# Patient Record
Sex: MALE | Race: WHITE | URBAN - METROPOLITAN AREA
[De-identification: names, ages, dates, MRNs, and addresses within clinical notes are randomized per-mention and may not be internally consistent; named-entity substitution may affect disease eponyms.]

---

## 2019-09-26 DIAGNOSIS — M25.571 BILATERAL ANKLE PAIN, UNSPECIFIED CHRONICITY: Primary | ICD-10-CM

## 2019-09-26 DIAGNOSIS — M25.572 BILATERAL ANKLE PAIN, UNSPECIFIED CHRONICITY: Primary | ICD-10-CM

## 2019-09-27 ENCOUNTER — OFFICE VISIT (OUTPATIENT)
Dept: ORTHOPEDIC SURGERY | Age: 59
End: 2019-09-27
Payer: COMMERCIAL

## 2019-09-27 VITALS — BODY MASS INDEX: 26.48 KG/M2 | HEIGHT: 70 IN | WEIGHT: 185 LBS

## 2019-09-27 DIAGNOSIS — M76.62 ACHILLES TENDONITIS, BILATERAL: Primary | ICD-10-CM

## 2019-09-27 DIAGNOSIS — M76.61 ACHILLES TENDONITIS, BILATERAL: Primary | ICD-10-CM

## 2019-09-27 PROCEDURE — 99203 OFFICE O/P NEW LOW 30 MIN: CPT | Performed by: FAMILY MEDICINE

## 2019-09-27 RX ORDER — FLUTICASONE PROPIONATE 50 MCG
1 SPRAY, SUSPENSION (ML) NASAL
COMMUNITY

## 2019-09-27 RX ORDER — CETIRIZINE HYDROCHLORIDE 10 MG/1
10 TABLET ORAL
COMMUNITY

## 2019-09-27 RX ORDER — ROSUVASTATIN CALCIUM 10 MG/1
TABLET, COATED ORAL
COMMUNITY
Start: 2019-09-02

## 2019-09-27 RX ORDER — METAXALONE 800 MG/1
TABLET ORAL
Refills: 1 | COMMUNITY
Start: 2019-08-12

## 2021-12-02 ENCOUNTER — OFFICE VISIT (OUTPATIENT)
Dept: PRIMARY CARE CLINIC | Age: 61
End: 2021-12-02
Payer: COMMERCIAL

## 2021-12-02 VITALS
OXYGEN SATURATION: 96 % | DIASTOLIC BLOOD PRESSURE: 81 MMHG | HEIGHT: 70 IN | HEART RATE: 66 BPM | SYSTOLIC BLOOD PRESSURE: 123 MMHG | WEIGHT: 177 LBS | BODY MASS INDEX: 25.34 KG/M2

## 2021-12-02 DIAGNOSIS — F41.9 ANXIETY: ICD-10-CM

## 2021-12-02 DIAGNOSIS — N50.3 EPIDIDYMAL CYST: ICD-10-CM

## 2021-12-02 DIAGNOSIS — E78.00 HYPERCHOLESTEREMIA: Primary | ICD-10-CM

## 2021-12-02 PROCEDURE — 99203 OFFICE O/P NEW LOW 30 MIN: CPT | Performed by: INTERNAL MEDICINE

## 2021-12-02 RX ORDER — ROSUVASTATIN CALCIUM 20 MG/1
TABLET, COATED ORAL
COMMUNITY
End: 2022-04-12 | Stop reason: SDUPTHER

## 2021-12-02 RX ORDER — CETIRIZINE HYDROCHLORIDE 10 MG/1
CAPSULE, LIQUID FILLED ORAL
COMMUNITY

## 2021-12-02 RX ORDER — BUSPIRONE HYDROCHLORIDE 5 MG/1
TABLET ORAL
COMMUNITY
Start: 2021-10-11

## 2021-12-02 RX ORDER — CLONAZEPAM 0.12 MG/1
0.12 TABLET, ORALLY DISINTEGRATING ORAL
Qty: 40 TABLET | Refills: 0 | Status: SHIPPED | OUTPATIENT
Start: 2021-12-02 | End: 2021-12-14 | Stop reason: ALTCHOICE

## 2021-12-02 RX ORDER — FLUTICASONE PROPIONATE 50 MCG
2 SPRAY, SUSPENSION (ML) NASAL DAILY
COMMUNITY

## 2021-12-02 RX ORDER — CLONAZEPAM 0.5 MG/1
TABLET ORAL
COMMUNITY
Start: 2021-11-08 | End: 2021-12-02 | Stop reason: ALTCHOICE

## 2021-12-02 NOTE — PROGRESS NOTES
Identified pt with two pt identifiers(name and ). Reviewed record in preparation for visit and have obtained necessary documentation. Chief Complaint   Patient presents with    New Patient    Establish Care        Vitals:    21 1527   BP: 123/81   Pulse: 66   SpO2: 96%   Weight: 177 lb (80.3 kg)   Height: 5' 10\" (1.778 m)   PainSc:   0 - No pain       Health Maintenance Due   Topic    Hepatitis C Screening     Lipid Screen     DTaP/Tdap/Td series (1 - Tdap)    Colorectal Cancer Screening Combo     Shingrix Vaccine Age 50> (1 of 2)       Coordination of Care Questionnaire:  :   1) Have you been to an emergency room, urgent care, or hospitalized since your last visit? If yes, where when, and reason for visit? N/A       2. Have seen or consulted any other health care provider since your last visit?    If yes, where when, and reason for visit?  n/a

## 2021-12-02 NOTE — PROGRESS NOTES
Leona Arreola is a 57 y.o.  male and presents with     Chief Complaint   Patient presents with    New Patient    Establish Care     Pt is here to establish care. Pt used to live in [place] and moved to [facility] and lived there for 12 years before returning to [place]  Pt used to see Psych in [facility]. Pt used to see PCP in [facility],  [address] takes klonopin and buspar  For anxiety and depression  Pt had episode of epidymidis in the past and was treated with abx. Pt had vasectomy in the past.  Pt had colonscopy in the past and it was normal  PSA - was normal.        Past Medical History:   Diagnosis Date    Depression      History reviewed. No pertinent surgical history. Current Outpatient Medications   Medication Sig    busPIRone (BUSPAR) 5 mg tablet TAKE 1/2 TABLET BY MOUTH TWICE DAILY    rosuvastatin (Crestor) 20 mg tablet Take  by mouth.  fluticasone propionate (Flonase Allergy Relief) 50 mcg/actuation nasal spray 2 Sprays by Both Nostrils route daily.  Cetirizine (ZyrTEC) 10 mg cap Take  by mouth.  clonazePAM (KlonoPIN) 0.125 mg disintegrating tablet Take 1 Tablet by mouth two (2) times daily as needed for Anxiety. Max Daily Amount: 0.25 mg. No current facility-administered medications for this visit. Health Maintenance   Topic Date Due    Hepatitis C Screening  Never done    Lipid Screen  Never done    DTaP/Tdap/Td series (1 - Tdap) Never done    Colorectal Cancer Screening Combo  Never done    Shingrix Vaccine Age 50> (1 of 2) Never done    Flu Vaccine  Completed    COVID-19 Vaccine  Completed    Pneumococcal 0-64 years  Aged [employer] History   Administered Date(s) Administered    COVID-19, PFIZER, MRNA, LNP-S, PF, 30MCG/0.3ML DOSE 03/11/2021, 04/04/2021, 10/12/2021    Influenza Vaccine 11/01/2021     No LMP for male patient. Allergies and Intolerances: Allergies   Allergen Reactions    Grass Pollen Sneezing    Mold Sneezing       Family History:   History reviewed.  No pertinent family history. Social History:   He  reports that he has never smoked. He has never used smokeless tobacco.  He  reports current alcohol use. Review of Systems:   General: negative for - chills, fatigue, fever, weight change  Psych: negative for - anxiety, depression, irritability or mood swings  ENT: negative for - headaches, hearing change, nasal congestion, oral lesions, sneezing or sore throat  Heme/ Lymph: negative for - bleeding problems, bruising, pallor or swollen lymph nodes  Endo: negative for - hot flashes, polydipsia/polyuria or temperature intolerance  Resp: negative for - cough, shortness of breath or wheezing  CV: negative for - chest pain, edema or palpitations  GI: negative for - abdominal pain, change in bowel habits, constipation, diarrhea or nausea/vomiting  : negative for - dysuria, hematuria, incontinence, pelvic pain or vulvar/vaginal symptoms  MSK: negative for - joint pain, joint swelling or muscle pain  Neuro: negative for - confusion, headaches, seizures or weakness  Derm: negative for - dry skin, hair changes, rash or skin lesion changes          Physical:   Vitals:   Vitals:    12/02/21 1527   BP: 123/81   Pulse: 66   SpO2: 96%   Weight: 177 lb (80.3 kg)   Height: 5' 10\" (1.778 m)           Exam:   HEENT- atraumatic,normocephalic, awake, oriented, well nourished  Neck - supple,no enlarged lymph nodes, no JVD, no thyromegaly  Chest- CTA, no rhonchi, no crackles  Heart- rrr, no murmurs / gallop/rub  Abdomen- soft,BS+,NT, no hepatosplenomegaly  Ext - no c/c/edema   Neuro- no focal deficits. Power 5/5 all extremities  Skin - warm,dry, no obvious rashes.           Review of Data:   LABS:   No results found for: WBC, HGB, HCT, PLT, HGBEXT, HCTEXT, PLTEXT, HGBEXT, HCTEXT, PLTEXT  No results found for: NA, K, CL, CO2, GLU, BUN, CREA  No results found for: CHOL, CHOLX, CHLST, CHOLV, HDL, HDLP, LDL, LDLC, DLDLP, TGLX, TRIGL, TRIGP  No components found for: GPT        Impression / Plan:        ICD-10-CM ICD-9-CM    1. Hypercholesteremia  E78.00 272.0    2. Anxiety  F41.9 300.00 clonazePAM (KlonoPIN) 0.125 mg disintegrating tablet   3. Epididymal cyst  N50.3 608.89        Explained to patient risk benefits of the medications. Advised patient to stop meds if having any side effects. Pt verbalized understanding of the instructions. I have discussed the diagnosis with the patient and the intended plan as seen in the above orders. The patient has received an after-visit summary and questions were answered concerning future plans. I have discussed medication side effects and warnings with the patient as well. I have reviewed the plan of care with the patient, accepted their input and they are in agreement with the treatment goals. Reviewed plan of care. Patient has provided input and agrees with goals. Follow-up and Dispositions    · Return in about 4 months (around 4/2/2022).          Harjeet Young MD

## 2021-12-14 DIAGNOSIS — F41.9 ANXIETY: Primary | ICD-10-CM

## 2021-12-14 RX ORDER — CLONAZEPAM 0.5 MG/1
TABLET ORAL
Qty: 30 TABLET | Refills: 0 | Status: SHIPPED | OUTPATIENT
Start: 2021-12-14

## 2022-04-12 ENCOUNTER — OFFICE VISIT (OUTPATIENT)
Dept: PRIMARY CARE CLINIC | Age: 62
End: 2022-04-12
Payer: COMMERCIAL

## 2022-04-12 VITALS
TEMPERATURE: 97.8 F | RESPIRATION RATE: 18 BRPM | HEIGHT: 70 IN | SYSTOLIC BLOOD PRESSURE: 120 MMHG | HEART RATE: 69 BPM | OXYGEN SATURATION: 97 % | WEIGHT: 181 LBS | DIASTOLIC BLOOD PRESSURE: 72 MMHG | BODY MASS INDEX: 25.91 KG/M2

## 2022-04-12 DIAGNOSIS — Z12.5 PROSTATE CANCER SCREENING: ICD-10-CM

## 2022-04-12 DIAGNOSIS — E78.00 HYPERCHOLESTEREMIA: Primary | ICD-10-CM

## 2022-04-12 DIAGNOSIS — F41.9 ANXIETY: ICD-10-CM

## 2022-04-12 PROCEDURE — 99213 OFFICE O/P EST LOW 20 MIN: CPT | Performed by: INTERNAL MEDICINE

## 2022-04-12 RX ORDER — ROSUVASTATIN CALCIUM 20 MG/1
20 TABLET, COATED ORAL
Qty: 90 TABLET | Refills: 1 | Status: SHIPPED | COMMUNITY
Start: 2022-04-12 | End: 2022-04-12 | Stop reason: SDUPTHER

## 2022-04-12 RX ORDER — ROSUVASTATIN CALCIUM 20 MG/1
20 TABLET, COATED ORAL
Qty: 90 TABLET | Refills: 3 | Status: SHIPPED | OUTPATIENT
Start: 2022-04-12

## 2022-04-12 NOTE — PROGRESS NOTES
Chief Complaint   Patient presents with    Follow-up     4 month follow up         Visit Vitals  /72 (BP 1 Location: Right upper arm, BP Patient Position: Sitting)   Pulse 69   Temp 97.8 °F (36.6 °C) (Temporal)   Resp 18   Ht 5' 10\" (1.778 m)   Wt 181 lb (82.1 kg)   SpO2 97%   BMI 25.97 kg/m²        1. Have you been to the ER, urgent care clinic since your last visit? Hospitalized since your last visit? No    2. Have you seen or consulted any other health care providers outside of the [address] [place] [address] since your last visit? Include any pap smears or colon screening.  No

## 2022-04-12 NOTE — PROGRESS NOTES
Ronald Montero is a 57 y.o.  male and presents with     Chief Complaint   Patient presents with    Follow-up     4 month follow up      Pt is  Here for follow up. He had labs done at his job , had wellness exam and labs done were normal    Pt has nocturia times once or two. Pt already had colnoscopy 3 years back and it was normal  Pt feels fine  NO constipation , rectal bleeding or dark stools  No abdominal pain nausea vomiting, no stools or rectal bleeding      Past Medical History:   Diagnosis Date    Depression      No past surgical history on file. Current Outpatient Medications   Medication Sig    rosuvastatin (Crestor) 20 mg tablet Take 1 Tablet by mouth nightly.  clonazePAM (KlonoPIN) 0.5 mg tablet Half tab orally  has prn    busPIRone (BUSPAR) 5 mg tablet TAKE 1/2 TABLET BY MOUTH TWICE DAILY    fluticasone propionate (Flonase Allergy Relief) 50 mcg/actuation nasal spray 2 Sprays by Both Nostrils route daily.  Cetirizine (ZyrTEC) 10 mg cap Take  by mouth. No current facility-administered medications for this visit. Health Maintenance   Topic Date Due    Lipid Screen  Never done    DTaP/Tdap/Td series (1 - Tdap) Never done    Shingrix Vaccine Age 50> (1 of 2) Never done    Depression Screen  12/02/2022    Colorectal Cancer Screening Combo  05/09/2029    Flu Vaccine  Completed    COVID-19 Vaccine  Completed    Pneumococcal 0-64 years  Aged Out    Hepatitis C Screening  Discontinued     Immunization History   Administered Date(s) Administered    COVID-19, Pfizer Purple top, DILUTE for use, 12+ yrs, 30mcg/0.3mL dose 03/11/2021, 04/04/2021, 10/12/2021, 04/05/2022    Influenza Vaccine 11/01/2021     No LMP for male patient. Allergies and Intolerances: Allergies   Allergen Reactions    Grass Pollen Sneezing    Mold Sneezing       Family History:   No family history on file. Social History:   He  reports that he has never smoked.  He has never used smokeless tobacco.  He reports current alcohol use. Review of Systems:   General: negative for - chills, fatigue, fever, weight change  Psych: negative for - anxiety, depression, irritability or mood swings  ENT: negative for - headaches, hearing change, nasal congestion, oral lesions, sneezing or sore throat  Heme/ Lymph: negative for - bleeding problems, bruising, pallor or swollen lymph nodes  Endo: negative for - hot flashes, polydipsia/polyuria or temperature intolerance  Resp: negative for - cough, shortness of breath or wheezing  CV: negative for - chest pain, edema or palpitations  GI: negative for - abdominal pain, change in bowel habits, constipation, diarrhea or nausea/vomiting  : negative for - dysuria, hematuria, incontinence, pelvic pain or vulvar/vaginal symptoms  MSK: negative for - joint pain, joint swelling or muscle pain  Neuro: negative for - confusion, headaches, seizures or weakness  Derm: negative for - dry skin, hair changes, rash or skin lesion changes          Physical:   Vitals:   Vitals:    04/12/22 1534   BP: 120/72   Pulse: 69   Resp: 18   Temp: 97.8 °F (36.6 °C)   TempSrc: Temporal   SpO2: 97%   Weight: 181 lb (82.1 kg)   Height: 5' 10\" (1.778 m)           Exam:   HEENT- atraumatic,normocephalic, awake, oriented, well nourished  Neck - supple,no enlarged lymph nodes, no JVD, no thyromegaly  Chest- CTA, no rhonchi, no crackles  Heart- rrr, no murmurs / gallop/rub  Abdomen- soft,BS+,NT, no hepatosplenomegaly, mild suprapubic dullness  Ext - no c/c/edema   Neuro- no focal deficits. Power 5/5 all extremities  Skin - warm,dry, no obvious rashes.           Review of Data:   LABS:   No results found for: WBC, HGB, HCT, PLT, HGBEXT, HCTEXT, PLTEXT, HGBEXT, HCTEXT, PLTEXT  No results found for: NA, K, CL, CO2, GLU, BUN, CREA  No results found for: CHOL, CHOLX, CHLST, CHOLV, HDL, HDLP, LDL, LDLC, DLDLP, TGLX, TRIGL, TRIGP  No components found for: GPT        Impression / Plan:        ICD-10-CM ICD-9-CM 1. Hypercholesteremia  E78.00 272.0 CBC WITH AUTOMATED DIFF      LIPID PANEL      METABOLIC PANEL, COMPREHENSIVE      rosuvastatin (Crestor) 20 mg tablet      DISCONTINUED: rosuvastatin (Crestor) 20 mg tablet   2. Anxiety  F41.9 300.00    3. Prostate cancer screening  Z12.5 V76.44 PSA, DIAGNOSTIC (PROSTATE SPECIFIC AG)     Mild suprapubic dullness -could be related to slightly enlarged prostate. If PSA is normal and patient continues to have nocturia-may need to see urologist for possible BPH. Patient is seeing a mental health provider was prescribing him Klonopin and BuSpar. Explained to patient risk benefits of the medications. Advised patient to stop meds if having any side effects. Pt verbalized understanding of the instructions. I have discussed the diagnosis with the patient and the intended plan as seen in the above orders. The patient has received an after-visit summary and questions were answered concerning future plans. I have discussed medication side effects and warnings with the patient as well. I have reviewed the plan of care with the patient, accepted their input and they are in agreement with the treatment goals. Reviewed plan of care. Patient has provided input and agrees with goals.         Jacqueline Alcantara MD

## 2022-06-09 DIAGNOSIS — R97.20 ELEVATED PSA: Primary | ICD-10-CM

## 2023-02-21 ENCOUNTER — OFFICE VISIT (OUTPATIENT)
Dept: PRIMARY CARE CLINIC | Age: 63
End: 2023-02-21
Payer: COMMERCIAL

## 2023-02-21 VITALS
OXYGEN SATURATION: 96 % | SYSTOLIC BLOOD PRESSURE: 133 MMHG | DIASTOLIC BLOOD PRESSURE: 80 MMHG | BODY MASS INDEX: 26.63 KG/M2 | TEMPERATURE: 97.7 F | WEIGHT: 186 LBS | HEART RATE: 72 BPM | HEIGHT: 70 IN | RESPIRATION RATE: 18 BRPM

## 2023-02-21 DIAGNOSIS — Z12.5 PROSTATE CANCER SCREENING: Primary | ICD-10-CM

## 2023-02-21 DIAGNOSIS — M79.10 MUSCLE PAIN: ICD-10-CM

## 2023-02-21 DIAGNOSIS — L72.9 SCROTAL CYST: ICD-10-CM

## 2023-02-21 DIAGNOSIS — M54.50 ACUTE LEFT-SIDED LOW BACK PAIN WITHOUT SCIATICA: ICD-10-CM

## 2023-02-21 DIAGNOSIS — R09.81 NASAL CONGESTION: ICD-10-CM

## 2023-02-21 LAB
BILIRUB UR QL STRIP: NEGATIVE
GLUCOSE UR-MCNC: NEGATIVE MG/DL
KETONES P FAST UR STRIP-MCNC: NEGATIVE MG/DL
PH UR STRIP: 6.5 [PH] (ref 4.6–8)
PROT UR QL STRIP: NEGATIVE
QUICKVUE INFLUENZA TEST: NEGATIVE
SP GR UR STRIP: 1.03 (ref 1–1.03)
UA UROBILINOGEN AMB POC: NORMAL (ref 0.2–1)
URINALYSIS CLARITY POC: CLEAR
URINALYSIS COLOR POC: YELLOW
URINE BLOOD POC: NEGATIVE
URINE LEUKOCYTES POC: NEGATIVE
URINE NITRITES POC: NEGATIVE
VALID INTERNAL CONTROL?: YES

## 2023-02-21 PROCEDURE — 87804 INFLUENZA ASSAY W/OPTIC: CPT | Performed by: INTERNAL MEDICINE

## 2023-02-21 PROCEDURE — 81003 URINALYSIS AUTO W/O SCOPE: CPT | Performed by: INTERNAL MEDICINE

## 2023-02-21 PROCEDURE — 99214 OFFICE O/P EST MOD 30 MIN: CPT | Performed by: INTERNAL MEDICINE

## 2023-02-21 RX ORDER — METAXALONE 400 MG/1
400 TABLET ORAL
Qty: 30 TABLET | Refills: 1 | Status: SHIPPED | OUTPATIENT
Start: 2023-02-21

## 2023-02-21 NOTE — PROGRESS NOTES
Chief Complaint   Patient presents with    Back Pain     Low back pain x 9 days, pain level 1/10           Visit Vitals  /80 (BP 1 Location: Left upper arm, BP Patient Position: Sitting)   Pulse 72   Temp 97.7 °F (36.5 °C) (Oral)   Resp 18   Ht 5' 10\" (1.778 m)   Wt 186 lb (84.4 kg)   SpO2 96%   BMI 26.69 kg/m²        Health Maintenance Due   Topic    DTaP/Tdap/Td series (1 - Tdap)    Shingles Vaccine (1 of 2)        1. \"Have you been to the ER, urgent care clinic since your last visit? Hospitalized since your last visit? \" No    2. \"Have you seen or consulted any other health care providers outside of the [address] [place] [address] since your last visit? \" No     3. For patients aged 39-70: Has the patient had a colonoscopy / FIT/ Cologuard? Yes - no Care Gap present      If the patient is female:    4. For patients aged 41-77: Has the patient had a mammogram within the past 2 years? NA - based on age or sex      11. For patients aged 21-65: Has the patient had a pap smear?  NA - based on age or sex

## 2023-02-21 NOTE — PROGRESS NOTES
-Radha Mason is a 60 y.o.  male and presents with     Chief Complaint   Patient presents with    Back Pain     Low back pain x 9 days, pain level 1/10        Pt started with low back pain about 9 days back. He flew to [place] and when he came back he started with back pain  It started with back pain all over but it was more localized  to ne spot on the left side. Wife applied CBD cream and that seemed to help  NO h/o kidney stones. Pt does have h/o back problems  Muscle relaxant would help in the past by physician in [place]. No ho injuries ro accidents in the past.  Urine is clear. Wife has flu. Has nasal congestion  Pt sometimes feels swelling in scrotum  Had vasectomy in the past        Past Medical History:   Diagnosis Date    Depression      No past surgical history on file. Current Outpatient Medications   Medication Sig    metaxalone (SKELAXIN) 400 mg tablet Take 1 Tablet by mouth three (3) times daily as needed for Muscle Spasm(s). rosuvastatin (Crestor) 20 mg tablet Take 1 Tablet by mouth nightly. clonazePAM (KlonoPIN) 0.5 mg tablet Half tab orally  has prn    busPIRone (BUSPAR) 5 mg tablet TAKE 1/2 TABLET BY MOUTH TWICE DAILY    fluticasone propionate (FLONASE) 50 mcg/actuation nasal spray 2 Sprays by Both Nostrils route daily. Cetirizine (ZyrTEC) 10 mg cap Take  by mouth. No current facility-administered medications for this visit.      Health Maintenance   Topic Date Due    DTaP/Tdap/Td series (1 - Tdap) Never done    Shingles Vaccine (1 of 2) Never done    Lipid Screen  06/08/2023    Depression Screen  02/21/2024    Colorectal Cancer Screening Combo  05/09/2029    Flu Vaccine  Completed    COVID-19 Vaccine  Completed    Pneumococcal 0-64 years  Aged Out    Hepatitis C Screening  Discontinued     Immunization History   Administered Date(s) Administered    COVID-19, PFIZER PURPLE top, DILUTE for use, (age 15 y+), IM, 30mcg/0.3mL 03/11/2021, 04/04/2021, 10/12/2021, 04/05/2022, 10/18/2022 Influenza Vaccine 11/01/2021, 10/18/2022     No LMP for male patient. Allergies and Intolerances: Allergies   Allergen Reactions    Grass Pollen Sneezing    Mold Sneezing       Family History:   No family history on file. Social History:   He  reports that he has never smoked. He has never used smokeless tobacco.  He  reports current alcohol use. Review of Systems:   General: negative for - chills, fatigue, fever, weight change  Psych: negative for - anxiety, depression, irritability or mood swings  ENT: negative for - headaches, hearing change, nasal congestion, oral lesions, sneezing or sore throat  Heme/ Lymph: negative for - bleeding problems, bruising, pallor or swollen lymph nodes  Endo: negative for - hot flashes, polydipsia/polyuria or temperature intolerance  Resp: negative for - cough, shortness of breath or wheezing  CV: negative for - chest pain, edema or palpitations  GI: negative for - abdominal pain, change in bowel habits, constipation, diarrhea or nausea/vomiting  : negative for - dysuria, hematuria, incontinence, pelvic pain or vulvar/vaginal symptoms  MSK: negative for - joint pain, joint swelling or muscle pain  Neuro: negative for - confusion, headaches, seizures or weakness  Derm: negative for - dry skin, hair changes, rash or skin lesion changes          Physical:   Vitals:   Vitals:    02/21/23 1523   BP: 133/80   Pulse: 72   Resp: 18   Temp: 97.7 °F (36.5 °C)   TempSrc: Oral   SpO2: 96%   Weight: 186 lb (84.4 kg)   Height: 5' 10\" (1.778 m)           Exam:   HEENT- atraumatic,normocephalic, awake, oriented, well nourished  Neck - supple,no enlarged lymph nodes, no JVD, no thyromegaly  Chest- CTA, no rhonchi, no crackles  Heart- rrr, no murmurs / gallop/rub  Abdomen- soft,BS+,NT, no hepatosplenomegaly  Ext - no c/c/edema   Neuro- no focal deficits. Power 5/5 all extremities  Skin - warm,dry, no obvious rashes.    - has left supratestciular cyst and additional cyst on the left side        Review of Data:   LABS:   Lab Results   Component Value Date/Time    WBC 5.3 06/08/2022 08:27 AM    HGB 14.6 06/08/2022 08:27 AM    HCT 47.1 06/08/2022 08:27 AM    PLATELET 657 96/50/2446 08:27 AM     Lab Results   Component Value Date/Time    Sodium 139 06/08/2022 08:27 AM    Potassium 4.4 06/08/2022 08:27 AM    Chloride 105 06/08/2022 08:27 AM    CO2 31 06/08/2022 08:27 AM    Glucose 102 (H) 06/08/2022 08:27 AM    BUN 20 06/08/2022 08:27 AM    Creatinine 0.96 06/08/2022 08:27 AM     Lab Results   Component Value Date/Time    Cholesterol, total 128 06/08/2022 08:27 AM    HDL Cholesterol 47 06/08/2022 08:27 AM    LDL, calculated 59.2 06/08/2022 08:27 AM    Triglyceride 109 06/08/2022 08:27 AM     No components found for: GPT        Impression / Plan:        ICD-10-CM ICD-9-CM    1. Prostate cancer screening  Z12.5 V76.44 PSA, DIAGNOSTIC (PROSTATE SPECIFIC AG)      2. Nasal congestion  R09.81 478.19 AMB POC RAPID INFLUENZA TEST      3. Acute left-sided low back pain without sciatica  M54.50 724.2 AMB POC URINALYSIS DIP STICK AUTO W/O MICRO      4. Scrotal cyst  L72.9 706.2 US SCROTUM/TESTICLES      5. Muscle pain  M79.10 729.1 metaxalone (SKELAXIN) 400 mg tablet          Explained to patient risk benefits of the medications. Advised patient to stop meds if having any side effects. Pt verbalized understanding of the instructions. I have discussed the diagnosis with the patient and the intended plan as seen in the above orders. The patient has received an after-visit summary and questions were answered concerning future plans. I have discussed medication side effects and warnings with the patient as well. I have reviewed the plan of care with the patient, accepted their input and they are in agreement with the treatment goals. Reviewed plan of care. Patient has provided input and agrees with goals. Follow-up and Dispositions    Return in about 6 months (around 8/21/2023).          Parveen Newell Alejandra Mckinnon MD

## 2023-03-08 DIAGNOSIS — R97.20 ELEVATED PSA: Primary | ICD-10-CM

## 2023-03-21 DIAGNOSIS — E78.00 HYPERCHOLESTEREMIA: ICD-10-CM

## 2023-03-21 RX ORDER — ROSUVASTATIN CALCIUM 20 MG/1
TABLET, COATED ORAL
Qty: 90 TABLET | Refills: 3 | Status: SHIPPED | OUTPATIENT
Start: 2023-03-21

## 2023-12-06 ENCOUNTER — OFFICE VISIT (OUTPATIENT)
Age: 63
End: 2023-12-06

## 2023-12-06 VITALS
WEIGHT: 186 LBS | HEART RATE: 75 BPM | BODY MASS INDEX: 26.63 KG/M2 | SYSTOLIC BLOOD PRESSURE: 126 MMHG | RESPIRATION RATE: 18 BRPM | OXYGEN SATURATION: 94 % | HEIGHT: 70 IN | DIASTOLIC BLOOD PRESSURE: 84 MMHG | TEMPERATURE: 98.6 F

## 2023-12-06 DIAGNOSIS — M79.622 LEFT UPPER ARM PAIN: ICD-10-CM

## 2023-12-06 DIAGNOSIS — M25.512 ACUTE PAIN OF LEFT SHOULDER: Primary | ICD-10-CM

## 2023-12-06 RX ORDER — METHYLPREDNISOLONE 4 MG/1
TABLET ORAL
Qty: 1 KIT | Refills: 0 | Status: SHIPPED | OUTPATIENT
Start: 2023-12-06

## 2023-12-06 RX ORDER — KETOROLAC TROMETHAMINE 30 MG/ML
30 INJECTION, SOLUTION INTRAMUSCULAR; INTRAVENOUS ONCE
Status: COMPLETED | OUTPATIENT
Start: 2023-12-06 | End: 2023-12-06

## 2023-12-06 RX ADMIN — KETOROLAC TROMETHAMINE 30 MG: 30 INJECTION, SOLUTION INTRAMUSCULAR; INTRAVENOUS at 17:30

## 2023-12-06 NOTE — PATIENT INSTRUCTIONS
Thank you for choosing [facility]! A prescription for Medrol dose pack was sent to your pharmacy. Thank you for choosing [facility]! Use warm compresses and warm showers to the affected area. Do not apply warm compress directly to the site. Take Tylenol as needed for discomfort. Gentle massage to the affect area. Range of motion exercises (gentle) to decrease muscle spasms 2-3 times per day. .  Follow up with your PCP if symptoms persist or become worse.

## 2024-03-01 ENCOUNTER — HOSPITAL ENCOUNTER (OUTPATIENT)
Facility: HOSPITAL | Age: 64
Discharge: HOME | End: 2024-03-01
Attending: STUDENT IN AN ORGANIZED HEALTH CARE EDUCATION/TRAINING PROGRAM
Payer: COMMERCIAL

## 2024-03-01 VITALS
BODY MASS INDEX: 26.63 KG/M2 | HEART RATE: 64 BPM | SYSTOLIC BLOOD PRESSURE: 144 MMHG | WEIGHT: 186 LBS | DIASTOLIC BLOOD PRESSURE: 88 MMHG | HEIGHT: 70 IN

## 2024-03-01 DIAGNOSIS — R94.31 NONSPECIFIC ABNORMAL ELECTROCARDIOGRAM (ECG) (EKG): ICD-10-CM

## 2024-03-01 LAB
ECHO BSA: 2.04 M2
ECHO LV FRACTIONAL SHORTENING: 11 % (ref 28–44)
ECHO LV INTERNAL DIMENSION DIASTOLE INDEX: 1.83 CM/M2
ECHO LV INTERNAL DIMENSION DIASTOLIC: 3.7 CM (ref 4.2–5.9)
ECHO LV INTERNAL DIMENSION SYSTOLIC INDEX: 1.63 CM/M2
ECHO LV INTERNAL DIMENSION SYSTOLIC: 3.3 CM
ECHO LV IVSD: 1.1 CM (ref 0.6–1)
ECHO LV MASS 2D: 112.5 G (ref 88–224)
ECHO LV MASS INDEX 2D: 55.7 G/M2 (ref 49–115)
ECHO LV POSTERIOR WALL DIASTOLIC: 0.9 CM (ref 0.6–1)
ECHO LV RELATIVE WALL THICKNESS RATIO: 0.49
EKG DIAGNOSIS: ABNORMAL
STRESS BASELINE DIAS BP: 88 MMHG
STRESS BASELINE HR: 70 BPM
STRESS BASELINE SYS BP: 144 MMHG
STRESS ESTIMATED WORKLOAD: 7 METS
STRESS PEAK DIAS BP: 75 MMHG
STRESS PEAK SYS BP: 164 MMHG
STRESS PERCENT HR ACHIEVED: 88 %
STRESS POST PEAK HR: 137 BPM
STRESS RATE PRESSURE PRODUCT: ABNORMAL BPM*MMHG
STRESS STAGE 1 BP: ABNORMAL MMHG
STRESS STAGE 1 DURATION: 3 MIN:SEC
STRESS STAGE 1 HR: 113 BPM
STRESS STAGE 2 DURATION: 3 MIN:SEC
STRESS STAGE 2 HR: 136 BPM
STRESS STAGE RECOVERY 1 BP: ABNORMAL MMHG
STRESS STAGE RECOVERY 1 DURATION: 1 MIN:SEC
STRESS STAGE RECOVERY 1 HR: 111 BPM
STRESS STAGE RECOVERY 2 DURATION: 1 MIN:SEC
STRESS STAGE RECOVERY 2 HR: 93 BPM
STRESS STAGE RECOVERY 3 BP: ABNORMAL MMHG
STRESS STAGE RECOVERY 3 DURATION: 1 MIN:SEC
STRESS STAGE RECOVERY 3 HR: 96 BPM
STRESS STAGE RECOVERY 4 DURATION: 1 MIN:SEC
STRESS STAGE RECOVERY 4 HR: 88 BPM
STRESS TARGET HR: 156 BPM

## 2024-03-01 PROCEDURE — 6360000004 HC RX CONTRAST MEDICATION: Performed by: STUDENT IN AN ORGANIZED HEALTH CARE EDUCATION/TRAINING PROGRAM

## 2024-03-01 PROCEDURE — 93350 STRESS TTE ONLY: CPT

## 2024-03-01 RX ORDER — ASCORBIC ACID 500 MG
1000 TABLET ORAL DAILY
COMMUNITY

## 2024-03-01 RX ORDER — BUPROPION HYDROCHLORIDE 150 MG/1
150 TABLET ORAL EVERY MORNING
COMMUNITY

## 2024-03-01 RX ADMIN — PERFLUTREN 1.5 ML: 6.52 INJECTION, SUSPENSION INTRAVENOUS at 12:54

## 2024-03-16 RX ORDER — ROSUVASTATIN CALCIUM 20 MG/1
20 TABLET, COATED ORAL NIGHTLY
Qty: 90 TABLET | Refills: 3 | OUTPATIENT
Start: 2024-03-16